# Patient Record
(demographics unavailable — no encounter records)

---

## 2025-07-15 NOTE — HISTORY OF PRESENT ILLNESS
[de-identified] : neck pain- sore thoat [FreeTextEntry6] : fell asleep yesterday unlike him  cough and runny nose last night afebrile stomach pain norm priscilla (today)/bm

## 2025-07-15 NOTE — PHYSICAL EXAM
[Soft] : soft [NL] : warm, clear [Tender] : nontender [Distended] : nondistended [de-identified] : Throat slightly red